# Patient Record
Sex: FEMALE | Race: WHITE | Employment: PART TIME | URBAN - METROPOLITAN AREA
[De-identification: names, ages, dates, MRNs, and addresses within clinical notes are randomized per-mention and may not be internally consistent; named-entity substitution may affect disease eponyms.]

---

## 2022-12-29 ENCOUNTER — HOSPITAL ENCOUNTER (EMERGENCY)
Age: 57
Discharge: HOME | End: 2022-12-29
Attending: EMERGENCY MEDICINE
Payer: MEDICAID

## 2022-12-29 ENCOUNTER — APPOINTMENT (OUTPATIENT)
Dept: GENERAL RADIOLOGY | Age: 57
End: 2022-12-29
Payer: MEDICAID

## 2022-12-29 VITALS
SYSTOLIC BLOOD PRESSURE: 144 MMHG | RESPIRATION RATE: 16 BRPM | DIASTOLIC BLOOD PRESSURE: 89 MMHG | OXYGEN SATURATION: 99 % | HEART RATE: 81 BPM | TEMPERATURE: 98.6 F

## 2022-12-29 DIAGNOSIS — S40.021A CONTUSION OF MULTIPLE SITES OF RIGHT SHOULDER AND UPPER ARM, INITIAL ENCOUNTER: ICD-10-CM

## 2022-12-29 DIAGNOSIS — S40.011A CONTUSION OF MULTIPLE SITES OF RIGHT SHOULDER AND UPPER ARM, INITIAL ENCOUNTER: ICD-10-CM
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
## 2022-12-29 PROCEDURE — 73090 X-RAY EXAM OF FOREARM: CPT

## 2022-12-29 PROCEDURE — 73110 X-RAY EXAM OF WRIST: CPT

## 2022-12-29 PROCEDURE — 99283 EMERGENCY DEPT VISIT LOW MDM: CPT

## 2022-12-29 PROCEDURE — 73080 X-RAY EXAM OF ELBOW: CPT

## 2022-12-29 ASSESSMENT — PAIN SCALES - GENERAL: PAINLEVEL_OUTOF10: 1

## 2022-12-29 ASSESSMENT — PAIN - FUNCTIONAL ASSESSMENT: PAIN_FUNCTIONAL_ASSESSMENT: 0-10

## 2022-12-29 NOTE — ED NOTES
Pt discharged after reviewing AVS and follow up care. Pt verbalizes understandings and ambulated from department independently.       Sondra Mckenzie RN  12/29/22 [id number]

## 2022-12-29 NOTE — ED PROVIDER NOTES
CHIEF COMPLAINT  Fall ([place] Nett yesterday roller skating, injured right arm)      HISTORY OF PRESENT ILLNESS  Kwan Roger is a 56 y.o. female presenting for evaluation of fall, right arm pain. Patient states that she was rollerskating yesterday when she had fallen onto the right arm. She states that she has pain to the right mid forearm, some elbow pain. No additional injuries, head injury. She states that she has had some pain with movement. No other complaints, modifying factors or associated symptoms. I have reviewed the following from the nursing documentation. History reviewed. No pertinent past medical history. History reviewed. No pertinent surgical history. Family History   Problem Relation Age of Onset    Cancer Father         liver    High Blood Pressure Father     Early Death Brother         ? CVA or MI     Social History     Socioeconomic History    Marital status:      Spouse name: Valentino Landaverde    Number of children: 12    Years of education: Not on file    Highest education level: Not on file   Occupational History    Occupation: homemaker   Tobacco Use    Smoking status: Never    Smokeless tobacco: Never   Substance and Sexual Activity    Alcohol use: No    Drug use: Not on file    Sexual activity: Not on file   Other Topics Concern    Not on file   Social History Narrative    Does not exercise on regular basis, diet could be healthier     Social Determinants of Health     Financial Resource Strain: Not on file   Food Insecurity: Not on file   Transportation Needs: Not on file   Physical Activity: Not on file   Stress: Not on file   Social Connections: Not on file   Intimate Partner Violence: Not on file   Housing Stability: Not on file     No current facility-administered medications for this encounter. No current outpatient medications on file.      Allergies   Allergen Reactions    Sharon     Sulfa Antibiotics Swelling    Red Dye Nausea And Vomiting       REVIEW OF SYSTEMS  Review of Systems   Constitutional:  Negative for fever. Musculoskeletal:  Positive for arthralgias. Negative for gait problem and joint swelling. Skin:  Negative for rash and wound. PHYSICAL EXAM  BP (!) 144/89   Pulse 81   Temp 98.6 °F (37 °C) (Oral)   Resp 16   SpO2 99%   GENERAL APPEARANCE: Awake and alert. Cooperative. HEAD: Normocephalic. Atraumatic. HEART: RRR. ntact radial pulses 2+ bilaterally. LUNGS: Respirations unlabored without accessory muscle use. Speaking comfortably in full sentences. EXTREMITIES: No peripheral edema. No acute deformities. There is pain to palpation of the right mid forearm, no specific bony tenderness of the elbow, wrist  SKIN: Warm and dry. No acute rashes. NEUROLOGICAL: Alert and oriented X 3. No focal deficit  LABS  I have reviewed all labs for this visit. No results found for this visit on 12/29/22. RADIOLOGY  X-RAYS:  I have reviewed radiologic plain film image(s). ALL OTHER NON-PLAIN FILM IMAGES SUCH AS CT, ULTRASOUND AND MRI HAVE BEEN READ BY THE RADIOLOGIST. XR WRIST RIGHT (MIN 3 VIEWS)   Final Result   No acute fracture or dislocation. XR RADIUS ULNA RIGHT (2 VIEWS)   Final Result   No fracture or dislocation         XR ELBOW RIGHT (MIN 3 VIEWS)   Final Result   No acute osseous abnormality. No results found. During the patient's ED course, the patient was given:  Medications - No data to display     ED COURSE/MDM  Patient seen and evaluated. Patient presenting for mechanical fall with right arm pain, no obvious deformities on exam intact neurovascular exam.  X-ray imaging obtained of the right upper extremity, no osseous abnormality appreciated. She will be placed in a sling for comfort advised on supportive care and PCP follow-up. The patient will be discharged from the emergency department. The patient was counseled on their diagnosis and any medications prescribed.  They were advised on the need for PCP followup. They were counseled on the need to return to the emergency department if any of their symptoms were to worsen, change or have any other concerns. Discharged in stable condition. Patient was given scripts for the following medications. I counseled patient how to take these medications. There are no discharge medications for this patient. CLINICAL IMPRESSION  1. Fall, initial encounter    2. Contusion of multiple sites of right shoulder and upper arm, initial encounter        Blood pressure (!) 144/89, pulse 81, temperature 98.6 °F (37 °C), temperature source Oral, resp. rate 16, SpO2 99 %, not currently breastfeeding. DISPOSITION  Donald Johnson was discharged to home in stable condition. This chart was generated in part by using Dragon Dictation system and may contain errors related to that system including errors in grammar, punctuation, and spelling, as well as words and phrases that may be inappropriate. If there are any questions or concerns please feel free to contact the dictating provider for clarification.         Vaibhav Pierce MD  12/29/22 [id number]

## 2022-12-29 NOTE — DISCHARGE INSTRUCTIONS
X-rays did not show any fracture. You may take Tylenol and ibuprofen for pain and discomfort. Follow-up with your primary care doctor. Return for worsening symptoms.

## 2023-10-30 ENCOUNTER — HOSPITAL ENCOUNTER (OUTPATIENT)
Dept: GENERAL RADIOLOGY | Age: 58
Discharge: HOME | End: 2023-10-30
Payer: MEDICAID

## 2023-10-30 DIAGNOSIS — M79.644 PAIN OF RIGHT THUMB: ICD-10-CM

## 2023-10-30 DIAGNOSIS — M79.645 PAIN OF LEFT THUMB: ICD-10-CM

## 2023-10-30 PROCEDURE — 73130 X-RAY EXAM OF HAND: CPT
